# Patient Record
Sex: MALE | Race: WHITE
[De-identification: names, ages, dates, MRNs, and addresses within clinical notes are randomized per-mention and may not be internally consistent; named-entity substitution may affect disease eponyms.]

---

## 2017-10-02 VITALS — DIASTOLIC BLOOD PRESSURE: 83 MMHG | SYSTOLIC BLOOD PRESSURE: 124 MMHG

## 2021-08-24 ENCOUNTER — HOSPITAL ENCOUNTER (OUTPATIENT)
Dept: HOSPITAL 61 - KCIC CT | Age: 71
End: 2021-08-24
Attending: FAMILY MEDICINE
Payer: MEDICARE

## 2021-08-24 DIAGNOSIS — J43.9: ICD-10-CM

## 2021-08-24 DIAGNOSIS — J98.4: ICD-10-CM

## 2021-08-24 DIAGNOSIS — Z87.891: ICD-10-CM

## 2021-08-24 DIAGNOSIS — J84.10: ICD-10-CM

## 2021-08-24 DIAGNOSIS — Z12.2: Primary | ICD-10-CM

## 2021-08-24 PROCEDURE — 71271 CT THORAX LUNG CANCER SCR C-: CPT

## 2021-08-24 NOTE — [facility]
EXAM: CT CHEST WITHOUT CONTRAST (LDCT LUNG CANCER SCREENING).



HISTORY: Risk factors for pulmonary malignancy. Cigarette smoking history.



TECHNIQUE: CT of the chest was performed without intravenous contrast using a low-dose lung screening
 protocol. Findings analysis is based on ACR Lung-RADS v1.1. *One or more of the following individual
ized dose reduction techniques were utilized for this examination:  

1. Automated exposure control.  

2. Adjustment of the mA and/or kV according to patient size.  

3. Use of iterative reconstruction technique.



COMPARISON: None. 



FINDINGS: The heart is normal in size. The aorta is normal in caliber. There is no lymphadenopathy. T
here is no pneumothorax or pleural effusion. There is moderate to severe emphysema. There is parenchy
mal scarring within the right middle lobe along the horizontal fissure. There is no suspicious pulmon
aamir nodule.



There is a cardiac event monitor within the left chest wall. There is no acute finding involving the 
visualized upper abdomen. There is no acute or suspicious osseous finding. There are a few calcified 
granulomas. There is slight central bronchial wall thickening.



IMPRESSION/RECOMMENDATION:

1. No suspicious pulmonary nodule. Lung RADS category 1. Annual low dose lung cancer screening is rec
ommended.

2. Moderate severe emphysema. There is superimposed pleural parenchymal scarring along the right david
r fissure and slight central bronchial wall thickening, a finding which can be seen as a sequela of b
ronchitis.



Electronically signed by: Dixie Do MD (8/24/2021 1:12 PM) [id number]